# Patient Record
Sex: FEMALE | Race: WHITE | Employment: FULL TIME | URBAN - METROPOLITAN AREA
[De-identification: names, ages, dates, MRNs, and addresses within clinical notes are randomized per-mention and may not be internally consistent; named-entity substitution may affect disease eponyms.]

---

## 2018-08-28 ENCOUNTER — APPOINTMENT (OUTPATIENT)
Dept: GENERAL RADIOLOGY | Age: 38
End: 2018-08-28
Payer: COMMERCIAL

## 2018-08-28 ENCOUNTER — HOSPITAL ENCOUNTER (EMERGENCY)
Age: 38
Discharge: HOME | End: 2018-08-28
Attending: EMERGENCY MEDICINE
Payer: COMMERCIAL

## 2018-08-28 VITALS
BODY MASS INDEX: 31.45 KG/M2 | DIASTOLIC BLOOD PRESSURE: 101 MMHG | WEIGHT: 207.5 LBS | SYSTOLIC BLOOD PRESSURE: 130 MMHG | TEMPERATURE: 98.5 F | HEIGHT: 68 IN | RESPIRATION RATE: 20 BRPM | OXYGEN SATURATION: 100 % | HEART RATE: 86 BPM

## 2018-08-28 DIAGNOSIS — S92.501A CLOSED FRACTURE OF PHALANX OF RIGHT THIRD TOE, INITIAL ENCOUNTER: Primary | ICD-10-CM

## 2018-08-28 PROCEDURE — 73660 X-RAY EXAM OF TOE(S): CPT

## 2018-08-28 PROCEDURE — L4387 NON-PNEUM WALK BOOT PRE OTS: HCPCS

## 2018-08-28 PROCEDURE — 99283 EMERGENCY DEPT VISIT LOW MDM: CPT

## 2018-08-28 ASSESSMENT — PAIN DESCRIPTION - DESCRIPTORS: DESCRIPTORS: THROBBING

## 2018-08-28 ASSESSMENT — PAIN DESCRIPTION - LOCATION: LOCATION: TOE (COMMENT WHICH ONE)

## 2018-08-28 ASSESSMENT — PAIN SCALES - GENERAL: PAINLEVEL_OUTOF10: 5

## 2018-08-28 ASSESSMENT — PAIN DESCRIPTION - PAIN TYPE: TYPE: ACUTE PAIN

## 2018-08-28 NOTE — ED PROVIDER NOTES
TRIAGE CHIEF COMPLAINT:   Chief Complaint   Patient presents with    Foot Injury     kicked rt foot striking toes on cabinet  c/o pain in third digit rt foot extending into foot         HPI: Garrison Huitron is a 42 y.o. female who presents to the Emergency Department with complaint of Pain in her right third toe. She was trying to kick a ball to her dog when she actually missed the ball and hit the corner of the cabinet today. Complains of bruising and pain in the right third toe. Denies any other toe injury. She has no numbness or weakness. Denies any pain in the foot or ankle. REVIEW OF SYSTEMS:  6 systems reviewed. Pertinent positives per HPI. Otherwise noted to be negative. Nursing notes reviewed and agree with above. Past medical/surgical history reviewed. ALLERGIES   Allergies   Allergen Reactions    Other      Some antibiotic she got 9 years ago         BP (!) 130/101   Pulse 86   Temp 98.5 °F (36.9 °C)   Resp 20   Ht 5' 8\" (1.727 m)   Wt 94.1 kg (207 lb 8 oz)   SpO2 100%   BMI 31.55 kg/m²   General:  No acute distress. Non toxic appearance  Head:   Normocephalic and atraumatic  Eyes:   Conjunctiva clear, EOM's intact. Sclera anicteric. ENT:   Mucous membranes moist  Neck:   Supple. No adenopathy or jugular venous distension  Lungs/Chest:  No respiratory distress  CVS:   Regular rate and rhythm  Abdomen:  Deferred  Extremities:  Patient has some tenderness of the right third toe with slight soft tissue swelling and some bruising underneath the toe. The toe is tender. The other toes are nontender. No tenderness of the proximal foot or ankle. Distal neurovascular exam is intact. Skin:   No rashes or lesions to exposed skin  Back:   Deferred  Neuro:  Alert and OX3. Speech clear. No focal weakness.         Gait normal.  Psych:   Affect normal. Mood normal        RADIOLOGY:  XR TOE RIGHT (MIN 2 VIEWS)   Final Result      Acute oblique fracture mid shaft proximal phalanx third digit